# Patient Record
Sex: MALE | Race: OTHER
[De-identification: names, ages, dates, MRNs, and addresses within clinical notes are randomized per-mention and may not be internally consistent; named-entity substitution may affect disease eponyms.]

---

## 2017-06-17 ENCOUNTER — HOSPITAL ENCOUNTER (EMERGENCY)
Dept: HOSPITAL 74 - FER | Age: 37
LOS: 1 days | Discharge: HOME | End: 2017-06-18
Payer: COMMERCIAL

## 2017-06-17 VITALS — HEART RATE: 80 BPM | SYSTOLIC BLOOD PRESSURE: 146 MMHG | DIASTOLIC BLOOD PRESSURE: 95 MMHG | TEMPERATURE: 97.9 F

## 2017-06-17 VITALS — BODY MASS INDEX: 32.5 KG/M2

## 2017-06-17 DIAGNOSIS — E78.5: Primary | ICD-10-CM

## 2017-06-17 NOTE — PDOC
History of Present Illness





- General


Chief Complaint: Pain


Stated Complaint: PAIN


Time Seen by Provider: 06/17/17 23:39





- History of Present Illness


Initial Comments: 


This 36-year-old man with no known previous medical problems presents with 

several week history of progressive shortness of breath with exertion 

associated with chest discomfort.  Today, patient was visiting his family in 

the area .  He was brought here by family members for evaluation.  Patient 

states that over the last several weeks he has not able to use treadmill at his 

gym because of shortness of breath after several minutes, even at low rate of 

speed.  Sometimes shortness of breath is accompanied by chest tightness which 

he describes as occurring in the middle of his chest.  He has not had wheezing/

cough/fever.  


Family member has taken the patient's blood pressure in the past on home 

monitor and it is commonly elevated(systolic as high as 200 mmHg).


Patient states that he has taken multiple stimulants in the past including 

cocaine but has not used any for more than 10 years.  He also has a history of 

using anabolic steroids.


Coronary artery disease risk factors: Positive for family history(father had 

first MI in 40s reportedly).  No diagnosis of hypertension has been made.  He 

has no history of diabetes mellitus, hyperlipidemia or smoking





On no medications





No known ALLERGIES

















Past History





- Past Medical History


Allergies/Adverse Reactions: 


 Allergies











Allergy/AdvReac Type Severity Reaction Status Date / Time


 


No Known Allergies Allergy   Unverified 06/17/17 23:36











Home Medications: 


Ambulatory Orders





Atorvastatin Ca [Lipitor] 20 mg PO HS #14 tablet 06/18/17 











- Psycho/Social/Smoking Cessation Hx


Anxiety: No


Suicidal Ideation: No


Smoking History: Never smoked





**Review of Systems





- Review of Systems


Able to Perform ROS?: Yes


Comments:: 





12 point review of systems is negative except for what is noted in the history 

of present illness








*Physical Exam





- Vital Signs


 Last Vital Signs











Temp Pulse Resp BP Pulse Ox


 


 97.9 F   80   16   146/95   100 


 


 06/17/17 23:36  06/17/17 23:36  06/17/17 23:36  06/17/17 23:36  06/17/17 23:36














- Physical Exam


Comments: 


GENERAL: Adult male, alert and oriented 3, in no acute distress


HEAD: Normal with no signs of trauma.


EYES: PERRLA, EOMI, sclera anicteric, conjunctiva clear.


ENT: Ears normal, nares patent, oropharynx clear without exudates.  Dry mucous 

membranes.


NECK: Normal range of motion, supple without lymphadenopathy, JVD, or masses.


LUNGS: Breath sounds equal, clear to auscultation bilaterally.  No wheezes, and 

no crackles.


HEART:Regular rate and rhythm, normal S1 and S2 without murmur, rub or gallop.


ABDOMEN:.normal bowel sounds  No guarding,tenderness or rebound.No masses No 

distention. 


EXTREMITIES: Normal range of motion, no edema.  No clubbing or cyanosis. No 

erythema, or tenderness.


NEUROLOGICAL: Cranial nerves II through XII grossly intact.  Normal speech.  No 

focal 


neurological deficits. 


MUSCULOSKELETAL:  Back non-tender to palpation, no CVA tenderness


SKIN: Warm, Dry, normal turgor, no rashes or lesions noted.








12-lead electrocardiogram is performed and interpreted by me.  This shows 

normal sinus rhythm at 80 bpm; axis, intervals and wave forms are all normal.








ED Treatment Course





- LABORATORY


CBC & Chemistry Diagram: 


 06/18/17 00:30





 06/18/17 00:30





Progress Note





- Progress Note


Progress Note: 


This 36-year-old man without significant past medical history presents with 

several weeks of exertional shortness of breath and chest pain.


Patient and family member accompanying him have stated that he has had elevated 

blood pressure readings on home monitor in the last several weeks.  There is a 

family history of coronary artery disease, hypertension and hyperlipidemia.  

Blood pressure on presentation now is 146/95.  Exam, as noted, is normal.





Twelve-lead electrocardiogram is performed and shows normal sinus rhythm at 80 

bpm; waveforms, axis and intervals are all normal.  No evidence of acute ST or T

-wave abnormalities.  





CBC, chemistry profile and cardiac enzymes will be sent


Because of the patient's strong family history of hyperlipidemia, family 

members asked if lipid profile could be performed, although patient is not 

fasting currently.





Laboratory evaluation, including cardiac enzymes,  essentially normal





Nonfasting lipid profile shows marked increase in triglycerides as well as LDL.





It was strongly suggested to the patient that he follow up with a medical 

doctor within the next 48 hours.  The patient does not live in the area (lives 

in [place], [place]) but family members state that patient will be 

followed up in the very near future.  They asked if staff and drug could be 

prescribed until he is seen by a general medical doctor and prescription for 

Lipitor 20 mg daily will be transmitted to his pharmacy.


Patient should return to the nearest medical facility immediately if he has 

persistent chest pain/shortness of breath, especially if it occurs at rest.  

All laboratory studies and electrocardiogram duplicate provided for the patient.











*DC/Admit/Observation/Transfer


Diagnosis at time of Disposition: 


Hyperlipidemia


Qualifiers:


 Hyperlipidemia type: unspecified Qualified Code(s): E78.5 - Hyperlipidemia, 

unspecified





- Discharge Dispostion


Disposition: HOME


Condition at time of disposition: Stable





- Prescriptions


Prescriptions: 


Atorvastatin Ca [Lipitor] 20 mg PO HS #14 tablet





- Patient Instructions


Printed Discharge Instructions:  High Triglycerides, High Blood Pressure, Low-

Sodium Diet


Additional Instructions: 


Followup with general doctor within 48 hours


Low-sodium diet


Return to ER if you have sustained chest pain or shortness of breath

## 2017-06-18 LAB
ALBUMIN SERPL-MCNC: 4.3 G/DL (ref 3.4–5)
ALP SERPL-CCNC: 51 U/L (ref 45–117)
ALT SERPL-CCNC: 47 U/L (ref 12–78)
ANION GAP SERPL CALC-SCNC: 11 MMOL/L (ref 8–16)
AST SERPL-CCNC: 36 U/L (ref 15–37)
BASOPHILS # BLD: 0.3 % (ref 0–2)
BILIRUB SERPL-MCNC: 2 MG/DL (ref 0.2–1)
CALCIUM SERPL-MCNC: 8.7 MG/DL (ref 8.5–10.1)
CHOLEST SERPL-MCNC: 328 MG/DL (ref 50–200)
CO2 SERPL-SCNC: 29 MMOL/L (ref 21–32)
CREAT SERPL-MCNC: 0.9 MG/DL (ref 0.7–1.3)
DEPRECATED RDW RBC AUTO: 15.1 % (ref 11.9–15.9)
EOSINOPHIL # BLD: 2.3 % (ref 0–4.5)
GLUCOSE SERPL-MCNC: 92 MG/DL (ref 74–106)
INR BLD: 1.12 (ref 0.82–1.09)
LDLC SERPL CALC-MCNC: 231 MG/DL (ref 5–100)
MCH RBC QN AUTO: 30 PG (ref 25.7–33.7)
MCHC RBC AUTO-ENTMCNC: 34.7 G/DL (ref 32–35.9)
MCV RBC: 86.5 FL (ref 80–96)
NEUTROPHILS # BLD: 60.4 % (ref 42.8–82.8)
PLATELET # BLD AUTO: 197 K/MM3 (ref 134–434)
PMV BLD: 8.9 FL (ref 7.5–11.1)
PROT SERPL-MCNC: 7.2 G/DL (ref 6.4–8.2)
PT PNL PPP: 12.3 SEC (ref 9.98–11.88)
TROPONIN I SERPL-MCNC: < 0.02 NG/ML (ref 0–0.05)
WBC # BLD AUTO: 8.8 K/MM3 (ref 4–10)

## 2017-06-19 NOTE — EKG
Test Reason : 

Blood Pressure : ***/*** mmHG

Vent. Rate : 080 BPM     Atrial Rate : 080 BPM

   P-R Int : 152 ms          QRS Dur : 088 ms

    QT Int : 380 ms       P-R-T Axes : 061 -01 012 degrees

   QTc Int : 438 ms

 

SINUS RHYTHM

NONSPECIFIC T WAVE ABNORMALITY

NO PREVIOUS ECGS AVAILABLE

Confirmed by TITI EGAN MD ([id number]) on 6/19/2017 8:35:39 AM

 

Referred By: MD CRISOSTOMO           Confirmed By:TITI EGAN MD

## 2018-01-15 ENCOUNTER — HOSPITAL ENCOUNTER (EMERGENCY)
Dept: HOSPITAL 74 - JERFT | Age: 38
Discharge: HOME | End: 2018-01-15
Payer: COMMERCIAL

## 2018-01-15 VITALS — HEART RATE: 72 BPM | DIASTOLIC BLOOD PRESSURE: 73 MMHG | SYSTOLIC BLOOD PRESSURE: 162 MMHG | TEMPERATURE: 97.5 F

## 2018-01-15 VITALS — BODY MASS INDEX: 31.1 KG/M2

## 2018-01-15 DIAGNOSIS — L50.9: Primary | ICD-10-CM

## 2018-01-15 DIAGNOSIS — I10: ICD-10-CM

## 2018-01-15 DIAGNOSIS — E78.00: ICD-10-CM

## 2018-01-15 NOTE — PDOC
History of Present Illness





- General


Chief Complaint: Rash


Stated Complaint: RASH


Time Seen by Provider: 01/15/18 09:29


History Source: Patient


Exam Limitations: No Limitations





- History of Present Illness


Initial Comments: 





01/15/18 09:37


Patient is a 36 y/o male history of hypertension and high cholesterol presents 

with generalized urticaria and wheals. Patient denies any new lotion soaps or 

detergents, no new medications no new foods. Is requesting something for the 

itching. Denies any respiratory difficulty, no difficulty swallowing, no fever. 

Denies any other symptoms. 





Past Medical History: [Denies].  





Allergies: No known allergies





Medications: [None]





Family History: Non-contributory





Social History: Denies smoking, alcohol use, or IVDU





Review of Systems


GENERAL/CONSTITUTIONAL: [No fever or chills. No weakness. No weight change.]


HEAD, EYES, EARS, NOSE AND THROAT: [No change in vision. No ear pain or 

discharge. No sore throat. ]


CARDIOVASCULAR: [No chest pain or shortness of breath.]


RESPIRATORY: [No cough, wheezing, or hemoptysis.]


GASTROINTESTINAL: [No nausea, vomiting, diarrhea or constipation. No rectal 

bleeding.]


GENITOURINARY: [No dysuria, frequency, or change in urination.]


MUSCULOSKELETAL: [No joint or muscle swelling or pain. No neck or back pain.]


SKIN: [Generalized pruritic rash.]


NEUROLOGIC: [No headache, vertigo, loss of consciousness, or loss of sensation.]


PSYCHIATRIC: [No depression or anxiety.]


ENDOCRINE: [No increased thirst. No abnormal weight change.]


HEMATOLOGIC/LYMPHATIC: [No anemia, easy bleeding, or history of blood clots.]


ALLERGIC/IMMUNOLOGIC: [No hives or skin allergy. No latex allergy.]





Physical Exam: 


GENERAL: [The patient is awake, alert, and fully oriented, in no acute distress.

]


HEAD: [Normal with no signs of trauma.]


EYES: [Pupils equal, round and reactive to light, extraocular movements intact, 

sclera anicteric, conjunctiva clear.]


ENT: [Ears normal, nares patent, oropharynx clear without exudates.  Moist 

mucous membranes. No uvula deviation]


NECK: [Normal range of motion, supple without lymphadenopathy, JVD, or masses.]


LUNGS: [Breath sounds equal, clear to auscultation bilaterally.  No wheezes, 

and no crackles.]


HEART: [Regular rate and rhythm, normal S1 and S2 without murmur, rub or gallop.

]


ABDOMEN: [Soft, nontender, normoactive bowel sounds.  No guarding, no rebound.  

No masses. No bruising or abrasions]


RECTAL : [Guaiac negative, normal rectal tone.]


MUSCULOSKELETAL: [Normal range of motion, no edema.  No clubbing or cyanosis. 

No cords, erythema, or tenderness. No CVA Tenderness with fist.]


NEUROLOGICAL: [Cranial nerves II through XII grossly intact.  Normal speech, 

normal gait.]


SKIN: [Generalized wheals concentrated more to upper extremities]


01/15/18 20:06








Past History





- Past Medical History


Allergies/Adverse Reactions: 


 Allergies











Allergy/AdvReac Type Severity Reaction Status Date / Time


 


No Known Allergies Allergy   Verified 01/15/18 08:59











Home Medications: 


Ambulatory Orders





Atorvastatin Ca [Lipitor] 20 mg PO HS #14 tablet 06/18/17 


Hydroxyzine HCl [Atarax -] 25 mg PO TID #12 tablet 01/15/18 


Methylprednisolone [Medrol Dose Miguelangel] 4 mg PO ASDIR #21 tablet 01/15/18 








CVA: No


COPD: No


DVT: No





- Immunization History


Immunization Up to Date: Yes





- Suicide/Smoking/Psychosocial Hx


Smoking History: Never smoked


Have you smoked in the past 12 months: No


Information on smoking cessation initiated: No


Hx Alcohol Use: No


Drug/Substance Use Hx: No


Substance Use Type: None





*Physical Exam





- Vital Signs


 Last Vital Signs











Temp Pulse Resp BP Pulse Ox


 


 97.5 F L  72   15   162/73   100 


 


 01/15/18 08:59  01/15/18 08:59  01/15/18 08:59  01/15/18 08:59  01/15/18 08:59














Medical Decision Making





- Medical Decision Making





01/15/18 20:10


A/P: Patient here for evaluation of generalized rash will DC patient home on 

Medrol Dosepak and Atarax for itching, follow-up with dermatology








*DC/Admit/Observation/Transfer


Diagnosis at time of Disposition: 


 Rash and nonspecific skin eruption








- Discharge Dispostion


Disposition: HOME


Condition at time of disposition: Good


Admit: No





- Prescriptions


Prescriptions: 


Hydroxyzine HCl [Atarax -] 25 mg PO TID #12 tablet


Methylprednisolone [Medrol Dose Miguelangel] 4 mg PO ASDIR #21 tablet





- Referrals


Referrals: 


[facility] [Non Staff, Medical] - 





- Patient Instructions


Printed Discharge Instructions:  DI for Rash


Additional Instructions: 


No new lotion soaps or detergents please take medication as prescribed and 

follow up as soon as possible with dermatology.





- Post Discharge Activity


Forms/Work/School Notes:  Back to Work